# Patient Record
Sex: FEMALE | Race: ASIAN | NOT HISPANIC OR LATINO | Employment: OTHER | URBAN - METROPOLITAN AREA
[De-identification: names, ages, dates, MRNs, and addresses within clinical notes are randomized per-mention and may not be internally consistent; named-entity substitution may affect disease eponyms.]

---

## 2017-02-21 ENCOUNTER — TRANSFERRED RECORDS (OUTPATIENT)
Dept: HEALTH INFORMATION MANAGEMENT | Facility: CLINIC | Age: 62
End: 2017-02-21

## 2023-04-14 ENCOUNTER — HOSPITAL ENCOUNTER (EMERGENCY)
Facility: CLINIC | Age: 68
Discharge: HOME | End: 2023-04-14
Attending: PHYSICIAN ASSISTANT | Admitting: PHYSICIAN ASSISTANT
Payer: MEDICARE

## 2023-04-14 ENCOUNTER — APPOINTMENT (OUTPATIENT)
Dept: GENERAL RADIOLOGY | Facility: CLINIC | Age: 68
End: 2023-04-14
Attending: PHYSICIAN ASSISTANT
Payer: MEDICARE

## 2023-04-14 VITALS
OXYGEN SATURATION: 97 % | HEIGHT: 62 IN | DIASTOLIC BLOOD PRESSURE: 80 MMHG | HEART RATE: 60 BPM | RESPIRATION RATE: 14 BRPM | TEMPERATURE: 98.2 F | SYSTOLIC BLOOD PRESSURE: 158 MMHG | BODY MASS INDEX: 26.31 KG/M2 | WEIGHT: 143 LBS

## 2023-04-14 DIAGNOSIS — I10 HYPERTENSION, UNSPECIFIED TYPE: ICD-10-CM

## 2023-04-14 DIAGNOSIS — D64.9 ANEMIA, UNSPECIFIED TYPE: ICD-10-CM

## 2023-04-14 DIAGNOSIS — T50.905A ADVERSE EFFECT OF DRUG, INITIAL ENCOUNTER: ICD-10-CM

## 2023-04-14 DIAGNOSIS — E87.6 HYPOKALEMIA: ICD-10-CM

## 2023-04-14 LAB
ALBUMIN SERPL BCG-MCNC: 4.3 G/DL (ref 3.5–5.2)
ALP SERPL-CCNC: 102 U/L (ref 35–104)
ALT SERPL W P-5'-P-CCNC: 33 U/L (ref 10–35)
ANION GAP SERPL CALCULATED.3IONS-SCNC: 6 MMOL/L (ref 7–15)
AST SERPL W P-5'-P-CCNC: 40 U/L (ref 10–35)
ATRIAL RATE - MUSE: 66 BPM
BASOPHILS # BLD AUTO: 0 10E3/UL (ref 0–0.2)
BASOPHILS NFR BLD AUTO: 0 %
BILIRUB SERPL-MCNC: 0.5 MG/DL
BUN SERPL-MCNC: 8.8 MG/DL (ref 8–23)
CALCIUM SERPL-MCNC: 8.7 MG/DL (ref 8.8–10.2)
CHLORIDE SERPL-SCNC: 105 MMOL/L (ref 98–107)
CREAT SERPL-MCNC: 0.66 MG/DL (ref 0.51–0.95)
DEPRECATED HCO3 PLAS-SCNC: 29 MMOL/L (ref 22–29)
DIASTOLIC BLOOD PRESSURE - MUSE: NORMAL MMHG
EOSINOPHIL # BLD AUTO: 0.1 10E3/UL (ref 0–0.7)
EOSINOPHIL NFR BLD AUTO: 4 %
ERYTHROCYTE [DISTWIDTH] IN BLOOD BY AUTOMATED COUNT: 16.2 % (ref 10–15)
GFR SERPL CREATININE-BSD FRML MDRD: >90 ML/MIN/1.73M2
GLUCOSE SERPL-MCNC: 110 MG/DL (ref 70–99)
HCT VFR BLD AUTO: 30.8 % (ref 35–47)
HGB BLD-MCNC: 10.5 G/DL (ref 11.7–15.7)
HOLD SPECIMEN: NORMAL
HOLD SPECIMEN: NORMAL
IMM GRANULOCYTES # BLD: 0 10E3/UL
IMM GRANULOCYTES NFR BLD: 0 %
INTERPRETATION ECG - MUSE: NORMAL
LYMPHOCYTES # BLD AUTO: 1.3 10E3/UL (ref 0.8–5.3)
LYMPHOCYTES NFR BLD AUTO: 46 %
MCH RBC QN AUTO: 22.1 PG (ref 26.5–33)
MCHC RBC AUTO-ENTMCNC: 34.1 G/DL (ref 31.5–36.5)
MCV RBC AUTO: 65 FL (ref 78–100)
MONOCYTES # BLD AUTO: 0.3 10E3/UL (ref 0–1.3)
MONOCYTES NFR BLD AUTO: 9 %
NEUTROPHILS # BLD AUTO: 1.2 10E3/UL (ref 1.6–8.3)
NEUTROPHILS NFR BLD AUTO: 41 %
NRBC # BLD AUTO: 0 10E3/UL
NRBC BLD AUTO-RTO: 0 /100
NT-PROBNP SERPL-MCNC: 187 PG/ML (ref 0–900)
P AXIS - MUSE: 55 DEGREES
PLATELET # BLD AUTO: 255 10E3/UL (ref 150–450)
POTASSIUM SERPL-SCNC: 3.3 MMOL/L (ref 3.4–5.3)
PR INTERVAL - MUSE: 182 MS
PROT SERPL-MCNC: 7.7 G/DL (ref 6.4–8.3)
QRS DURATION - MUSE: 144 MS
QT - MUSE: 490 MS
QTC - MUSE: 513 MS
R AXIS - MUSE: -4 DEGREES
RBC # BLD AUTO: 4.75 10E6/UL (ref 3.8–5.2)
SODIUM SERPL-SCNC: 140 MMOL/L (ref 136–145)
SYSTOLIC BLOOD PRESSURE - MUSE: NORMAL MMHG
T AXIS - MUSE: 97 DEGREES
TROPONIN T SERPL HS-MCNC: <6 NG/L
VENTRICULAR RATE- MUSE: 66 BPM
WBC # BLD AUTO: 2.9 10E3/UL (ref 4–11)

## 2023-04-14 PROCEDURE — 83880 ASSAY OF NATRIURETIC PEPTIDE: CPT | Performed by: PHYSICIAN ASSISTANT

## 2023-04-14 PROCEDURE — 80053 COMPREHEN METABOLIC PANEL: CPT | Performed by: EMERGENCY MEDICINE

## 2023-04-14 PROCEDURE — 83880 ASSAY OF NATRIURETIC PEPTIDE: CPT | Performed by: EMERGENCY MEDICINE

## 2023-04-14 PROCEDURE — 84484 ASSAY OF TROPONIN QUANT: CPT | Performed by: PHYSICIAN ASSISTANT

## 2023-04-14 PROCEDURE — 99285 EMERGENCY DEPT VISIT HI MDM: CPT | Mod: 25

## 2023-04-14 PROCEDURE — 85025 COMPLETE CBC W/AUTO DIFF WBC: CPT | Performed by: PHYSICIAN ASSISTANT

## 2023-04-14 PROCEDURE — 71046 X-RAY EXAM CHEST 2 VIEWS: CPT

## 2023-04-14 PROCEDURE — 93005 ELECTROCARDIOGRAM TRACING: CPT

## 2023-04-14 PROCEDURE — 80053 COMPREHEN METABOLIC PANEL: CPT | Performed by: PHYSICIAN ASSISTANT

## 2023-04-14 PROCEDURE — 84484 ASSAY OF TROPONIN QUANT: CPT | Performed by: EMERGENCY MEDICINE

## 2023-04-14 PROCEDURE — 36415 COLL VENOUS BLD VENIPUNCTURE: CPT | Performed by: EMERGENCY MEDICINE

## 2023-04-14 PROCEDURE — 85025 COMPLETE CBC W/AUTO DIFF WBC: CPT | Performed by: EMERGENCY MEDICINE

## 2023-04-14 PROCEDURE — 250N000013 HC RX MED GY IP 250 OP 250 PS 637: Performed by: PHYSICIAN ASSISTANT

## 2023-04-14 RX ORDER — POTASSIUM CHLORIDE 1500 MG/1
20 TABLET, EXTENDED RELEASE ORAL ONCE
Status: COMPLETED | OUTPATIENT
Start: 2023-04-14 | End: 2023-04-14

## 2023-04-14 RX ADMIN — POTASSIUM CHLORIDE 20 MEQ: 1500 TABLET, EXTENDED RELEASE ORAL at 21:10

## 2023-04-14 ASSESSMENT — ENCOUNTER SYMPTOMS
VOMITING: 0
SHORTNESS OF BREATH: 1
ANAL BLEEDING: 0

## 2023-04-14 ASSESSMENT — ACTIVITIES OF DAILY LIVING (ADL): ADLS_ACUITY_SCORE: 35

## 2023-04-14 NOTE — ED TRIAGE NOTES
Pt states she started amlodipine last week causing increased systemic edema. Pt also reports has new chest pressure that started last night. Pt denies SOB and N&V.

## 2023-04-15 NOTE — ED PROVIDER NOTES
History     Chief Complaint:  Chest Pain and Leg Swelling       The history is provided by the patient and a relative. The history is limited by a language barrier. A  was used (patient's son and daughter-in-law).      Roxanna Powell is a 68 year old female with a history of hypertension, anemia, and hyperlipidemia who presents with chest pain and leg swelling. Son of the patient says that the patient changed medications seven days ago. He states that the patient has been on hydrochlorothiazide for 20 years and then was switched to amlodipine last week for low potassium. The patient also stopped her lisinopril medication as well. Patient developed chest pain and extremity edema last night. Son also states patient's face was swollen but denies lip or tongue swelling. Patient contacted primary care who directed her to ED. Patient states she also has history of anemia but denies new bleeding, hematemesis, or dark or bloody stools. During interview, patient states her symptoms have resolved and complains of no chest pain or weakness. She denies history of blood clots or recent travel. Last dose of amlodipine was yesterday. Of note, patient was sick with cold last week as well.    Independent Historian:   Relative provides supplemental history.    Review of External Notes: Internal medicine note 04/06/23    ROS:  Review of Systems   Respiratory: Positive for shortness of breath (no longer present).    Cardiovascular: Positive for leg swelling (no longer present). Negative for chest pain.        Positive for full body swelling (no longer present)   Gastrointestinal: Negative for anal bleeding and vomiting (w/blood).   All other systems reviewed and are negative.    Allergies:  No known drug allergies     Medications:    Atorvastatin  Lisinopril  Amlodipine    Past Medical History:    Hypertension  Elevated cholesterol   Hemoglobin E disease  Vitamin D deficiency  "  Hyperlipidemia  Osteopenia  Anemia  Radiculopathy of cervical spine    Past Surgical History:    Colonoscopy   Tubal ligation      Social History:  Patient arrives via private vehicle with son and daughter-in-law  PCP: Sarah Cantu     Physical Exam     Patient Vitals for the past 24 hrs:   BP Temp Temp src Pulse Resp SpO2 Height Weight   04/14/23 2042 (!) 158/80 -- -- 60 14 97 % -- --   04/14/23 2041 -- -- -- 60 13 97 % -- --   04/14/23 1937 (!) 155/77 98.2  F (36.8  C) -- 66 18 -- 1.575 m (5' 2\") 64.9 kg (143 lb)   04/14/23 1453 (!) 140/65 97.9  F (36.6  C) Temporal 69 16 98 % -- --        Physical Exam  Constitutional: Alert, attentive, GCS 15  HENT:    Nose: Nose normal.    Mouth/Throat: Oropharynx is clear, mucous membranes are moist.  No oral mucosal or tongue swelling noted.  Eyes: EOM are normal.   CV: regular rate and rhythm; no murmurs, rubs or gallups.  No wheezing or stridor.  No evidence of upper or lower extremity edema.  Chest: Effort normal and breath sounds normal.   GI:  There is no tenderness. No distension. Normal bowel sounds  MSK: Normal range of motion.   Neurological: Alert, attentive  Skin: Skin is warm and dry. No rash.    Emergency Department Course   ECG  ECG results from 04/14/23   EKG 12 lead     Value    Systolic Blood Pressure     Diastolic Blood Pressure     Ventricular Rate 66    Atrial Rate 66    WY Interval 182    QRS Duration 144        QTc 513    P Axis 55    R AXIS -4    T Axis 97    Interpretation ECG      Sinus rhythm  Left bundle branch block  Abnormal ECG  No previous ECGs available  Confirmed by GENERATED REPORT, COMPUTER ([id number]),  Ledy Javier ([id number]) on 4/14/2023 2:59:54 PM         Imaging:  Chest XR,  PA & LAT   Final Result   IMPRESSION: No acute cardiopulmonary process.         Report per radiology    Laboratory:  Labs Ordered and Resulted from Time of ED Arrival to Time of ED Departure   COMPREHENSIVE METABOLIC PANEL - Abnormal       " Result Value    Sodium 140      Potassium 3.3 (*)     Chloride 105      Carbon Dioxide (CO2) 29      Anion Gap 6 (*)     Urea Nitrogen 8.8      Creatinine 0.66      Calcium 8.7 (*)     Glucose 110 (*)     Alkaline Phosphatase 102      AST 40 (*)     ALT 33      Protein Total 7.7      Albumin 4.3      Bilirubin Total 0.5      GFR Estimate >90     CBC WITH PLATELETS AND DIFFERENTIAL - Abnormal    WBC Count 2.9 (*)     RBC Count 4.75      Hemoglobin 10.5 (*)     Hematocrit 30.8 (*)     MCV 65 (*)     MCH 22.1 (*)     MCHC 34.1      RDW 16.2 (*)     Platelet Count 255      % Neutrophils 41      % Lymphocytes 46      % Monocytes 9      % Eosinophils 4      % Basophils 0      % Immature Granulocytes 0      NRBCs per 100 WBC 0      Absolute Neutrophils 1.2 (*)     Absolute Lymphocytes 1.3      Absolute Monocytes 0.3      Absolute Eosinophils 0.1      Absolute Basophils 0.0      Absolute Immature Granulocytes 0.0      Absolute NRBCs 0.0     TROPONIN T, HIGH SENSITIVITY - Normal    Troponin T, High Sensitivity <6     NT PROBNP INPATIENT - Normal    N terminal Pro BNP Inpatient 187          Emergency Department Course & Assessments:       Interventions:  Medications   potassium chloride ER (KLOR-CON M) CR tablet 20 mEq (20 mEq Oral $Given 4/14/23 2110)        Independent Interpretation (X-rays, CTs, rhythm strip):  Chest X-Ray normal    Assessments/Consultations/Discussion of Management or Tests:  ED Course as of 04/14/23 2111 Fri Apr 14, 2023 1943 I obtained history and examined the patient as noted above.    2040 I rechecked the patient and explained findings.        Social Determinants of Health affecting care:   None    Disposition:  The patient was discharged to home.     Impression & Plan    CMS Diagnoses: None    Medical Decision Making:  Patient is a well-appearing 68-year-old female who presents with peripheral edema and chest pain since starting amlodipine 1 week ago.  She is in no acute distress upon arrival.   Vital signs are appropriate, she is mildly hypertensive at 158/80.  Physical examination reveals no evidence of oral or tongue swelling.  No evidence of peripheral edema.  Differential includes ACS, PE, angioedema, adverse effect of the medication.  Preliminary labs obtained.  Leukopenia present with white blood cell count at 2.9.  Hemoglobin is also low at 10.5.  This is lower than her usual range which is 11-12 however there is no evidence of significant bleeding today including no GI bleed. EKG shows normal sinus rhythm with ventricular rate of 66.  LBBB is present however LBBB was present on EKG from May 2007.  Patient at this time complains of no chest pain or shortness of breath.  ACS unlikely.  PE is considered however there is no evidence to suggest a PE this evening. Using PERC criteria, patient fails due to age. Using Well's criteria, patient is low risk and  no D-dimer or CT PE study is indicated. Chest xray is unremarkable. Mild hypokalemia present at 3.3.  Results reviewed with patient.  No evidence of serious cardiac or pulmonary process this evening.  No evidence of angioedema on exam.  I suspect patient's symptoms are related to the recent switch to amlodipine.  I recommend she stop the amlodipine and also refrain from the HCTZ medication due to continued hypokalemia.  Continue lisinopril and statin as prescribed.  I recommend she follow-up with her primary care provider in 1 week for lab recheck.  Patient is otherwise asymptomatic and remains hemodynamically stable.  Supportive cares and return precautions to ED discussed.  Patient expressed understanding plan was ready for discharge.    Diagnosis:    ICD-10-CM    1. Adverse effect of drug, initial encounter  T50.905A       2. Hypertension, unspecified type  I10       3. Anemia, unspecified type  D64.9       4. Hypokalemia  E87.6            Discharge Medications:  New Prescriptions    No medications on file        Scribe Disclosure:  Argelia KONG  am serving as a scribe at 8:28 PM on 4/14/2023 to document services personally performed by Kennedi Mike PA-C based on my observations and the provider's statements to me.      4/14/2023   Kennedi Mike PA-C Steinbrueck, Emily, PA-C  04/14/23 2713

## 2023-04-15 NOTE — DISCHARGE INSTRUCTIONS
Your labs and imaging are reassuring today. Stop amlodipine medication. Continue Lisinopril and Atorvastatin medication as prescribed. Follow-up with primary care next week for lab recheck. For any new or worsening symptoms, return to Emergency Department.

## 2023-08-21 RX ORDER — HYDROCHLOROTHIAZIDE 12.5 MG/1
12.5 TABLET ORAL DAILY
Qty: 90 TABLET | Refills: 0 | OUTPATIENT
Start: 2023-08-21

## 2023-10-23 RX ORDER — HYDROCHLOROTHIAZIDE 12.5 MG/1
12.5 TABLET ORAL DAILY
Qty: 90 TABLET | Refills: 0 | OUTPATIENT
Start: 2023-10-23

## 2023-10-25 RX ORDER — HYDROCHLOROTHIAZIDE 12.5 MG/1
12.5 TABLET ORAL DAILY
Qty: 90 TABLET | Refills: 0 | OUTPATIENT
Start: 2023-10-25